# Patient Record
(demographics unavailable — no encounter records)

---

## 2025-03-02 NOTE — ASSESSMENT
[FreeTextEntry1] : #Abnormal TFTs - patient with history of significant allergies - recently tried xolair with worsening in skin rash- now having sensitivity to hives  - patient is also mentions episodes of possible low sugars and light headedness- ?adrenal insufficiency? history of several courses of steroids  - told in the past that her TPO antibodies were abnormal for possible hashimoto's  Plan: - Repeat TFTs including TPO antibodies  - will check for ACTH and 8am cortisol to rule out AI  - will call patient with results

## 2025-03-02 NOTE — HISTORY OF PRESENT ILLNESS
[FreeTextEntry1] : #Abnormal TFTs - patient has been struggling with food allergies, eczema and asthma majority of her life  - She recently tried Xolair and developed worsening in skin reaction  - now having significant hives  - she has also been on multiple courses of steroids over the last year  - does mention one episode where she felt lightheaded and dizzy as if her sugars were low  - during the workup by her allergist, she was found to have possible high TPO antibodies - Here for evaluation to see if any abnormality of her hormone levels  - periods are regular

## 2025-03-24 NOTE — REVIEW OF SYSTEMS
[Atopic Dermatitis] : atopic dermatitis [Nl] : Genitourinary [de-identified] : triamcinolone and Opzelura only for eczema

## 2025-03-24 NOTE — SOCIAL HISTORY
[House] : [unfilled] lives in a house  [None] : none [Single] : single [FreeTextEntry1] : [facility]  Lives with parents  [Smokers in Household] : there are no smokers in the home

## 2025-03-24 NOTE — PHYSICAL EXAM
[Alert] : alert [Well Nourished] : well nourished [No Acute Distress] : no acute distress [Well Developed] : well developed [No Neck Mass] : no neck mass was observed [No LAD] : no lymphadenopathy [Normal Rate and Effort] : normal respiratory rhythm and effort [No Crackles] : no crackles [No Retractions] : no retractions [Wheezing] : no wheezing was heard [Normal Cervical Lymph Nodes] : cervical [Patches] : ~M patches present [Normal Mood] : mood was normal [Judgment and Insight Age Appropriate] : judgement and insight is age appropriate [de-identified] : hyperpigmented patches - increased under her eyes - around lips - inner arms

## 2025-03-24 NOTE — REVIEW OF SYSTEMS
[Atopic Dermatitis] : atopic dermatitis [Nl] : Genitourinary [de-identified] : triamcinolone and Opzelura only for eczema

## 2025-03-24 NOTE — HISTORY OF PRESENT ILLNESS
[de-identified] : Patient is being seen for multiple allergies:   Drug allergies:  Penicillin: Age 4 - rash and joint swelling  Sulfa: Age 18 - vomiting   Food allergies: She has been seeing multiple allergists over the years  Milk:  diarrhea from Similac - changed to Alimentum - age 2 - switched to milk with vomiting - changed to rice milk - with cross contamination she would note hives - vomiting and throat tightness - light headedness - she has used EpiPen 2x 2023 and 2018.   Patient was good with baked milk products but mother advised to stop the introduction.   March 2023 - OIT with Dr. Nicole for milk - some oral itching with dosing.    At home she had an allergic reaction to OIT - needed ER visit.   Treated with Xolair and OIT - 11/23 - every two weeks - 300 mg every two weeks - she was able to tolerate 6 ounces of milk with no reaction until 6/24 - she noted lip swelling - ocular swelling and diffuse hives on her body - bruising on neck and arms - she stopped Xolair 8/24 and stopped milk ingestion 6/24.    She has continued with recurrent hives even off of Xolair - she presently takes Allegra 1-2 tablets in the AM - Xyzal 5mg in PM - benadryl prn - hydroxyzine 25 mg QHS in order sleep.   She has been treated with prednisone x 5 last year.     Dermatologist added Doxycycline to her treatment.  Skin biopsy - urticaria.    Her last dosage of Xolair was 600 mg x 2 - with exacerbation of her hives she noted severe hives at site of the injection.   Dr. Nicole advised her that this was a bacterial infection secondary to strept infection.    Oat - age 23 - facial flushing and abdominal pain  Wheat - age 23 - joint swelling - throat discomfort and hives  Gluten Sesame - age 10 - throat tightness - wheezing - vomiting - rash  Beef - age 8 - throat tightness - wheezing - rash  Stapleton - age 23 - diagnosed with alpha gal - 3 - 4 hours after ingestion   Latex: age 13 - contact allergy.   Patient with asthma since age 3 - worse when exposed to milk products - steamed milk -

## 2025-03-24 NOTE — SOCIAL HISTORY
[House] : [unfilled] lives in a house  [None] : none [Single] : single [FreeTextEntry1] : [facility] inform[facility]  Lives with parents  [Smokers in Household] : there are no smokers in the home

## 2025-03-24 NOTE — ASSESSMENT
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   Patient instructed on the proper use of an EpiPen - precautions - follow up after use of EpiPen - need to have device available at all times Patient instructed to have Benadryl, in addition to EpiPen, available at all times - reviewed indications for use of Benadryl - dosing - precautions and follow up.

## 2025-03-24 NOTE — PHYSICAL EXAM
[Alert] : alert [Well Nourished] : well nourished [No Acute Distress] : no acute distress [Well Developed] : well developed [No Neck Mass] : no neck mass was observed [No LAD] : no lymphadenopathy [Normal Rate and Effort] : normal respiratory rhythm and effort [No Crackles] : no crackles [No Retractions] : no retractions [Wheezing] : no wheezing was heard [Normal Cervical Lymph Nodes] : cervical [Patches] : ~M patches present [Normal Mood] : mood was normal [Judgment and Insight Age Appropriate] : judgement and insight is age appropriate [de-identified] : hyperpigmented patches - increased under her eyes - around lips - inner arms

## 2025-03-24 NOTE — HISTORY OF PRESENT ILLNESS
[de-identified] : Patient is being seen for multiple allergies:   Drug allergies:  Penicillin: Age 4 - rash and joint swelling  Sulfa: Age 18 - vomiting   Food allergies: She has been seeing multiple allergists over the years  Milk:  diarrhea from Similac - changed to Alimentum - age 2 - switched to milk with vomiting - changed to rice milk - with cross contamination she would note hives - vomiting and throat tightness - light headedness - she has used EpiPen 2x 2023 and 2018.   Patient was good with baked milk products but mother advised to stop the introduction.   March 2023 - OIT with Dr. Nicole for milk - some oral itching with dosing.    At home she had an allergic reaction to OIT - needed ER visit.   Treated with Xolair and OIT - 11/23 - every two weeks - 300 mg every two weeks - she was able to tolerate 6 ounces of milk with no reaction until 6/24 - she noted lip swelling - ocular swelling and diffuse hives on her body - bruising on neck and arms - she stopped Xolair 8/24 and stopped milk ingestion 6/24.    She has continued with recurrent hives even off of Xolair - she presently takes Allegra 1-2 tablets in the AM - Xyzal 5mg in PM - benadryl prn - hydroxyzine 25 mg QHS in order sleep.   She has been treated with prednisone x 5 last year.     Dermatologist added Doxycycline to her treatment.  Skin biopsy - urticaria.    Her last dosage of Xolair was 600 mg x 2 - with exacerbation of her hives she noted severe hives at site of the injection.   Dr. Nicole advised her that this was a bacterial infection secondary to strept infection.    Oat - age 23 - facial flushing and abdominal pain  Wheat - age 23 - joint swelling - throat discomfort and hives  Gluten Sesame - age 10 - throat tightness - wheezing - vomiting - rash  Beef - age 8 - throat tightness - wheezing - rash  Stapleton - age 23 - diagnosed with alpha gal - 3 - 4 hours after ingestion   Latex: age 13 - contact allergy.   Patient with asthma since age 3 - worse when exposed to milk products - steamed milk -

## 2025-04-15 NOTE — PHYSICAL EXAM
[Alert] : alert [Well Nourished] : well nourished [No Acute Distress] : no acute distress [Well Developed] : well developed [No Neck Mass] : no neck mass was observed [No LAD] : no lymphadenopathy [Normal Rate and Effort] : normal respiratory rhythm and effort [No Crackles] : no crackles [No Retractions] : no retractions [Wheezing] : no wheezing was heard [Normal Cervical Lymph Nodes] : cervical [Patches] : ~M patches present [Normal Mood] : mood was normal [Judgment and Insight Age Appropriate] : judgement and insight is age appropriate [de-identified] : hyperpigmented patches - increased under her eyes - around lips - inner arms

## 2025-04-15 NOTE — ASSESSMENT
[FreeTextEntry1] : Eczematous dermatitis:  Patient will return for more extensive patch testing  Dupixent reviewed with patient after completion of patch testing    Mild intermittent asthma:  Albuterol 2 puffs QID prn   Multiple food allergies:  Very high total IgE level with many mildly positive food ImmunoCAP based upon the high IgE  Milk allergy - S/P OIT with recurrent reactions - will hold off on OIT and Xolair at this time EpiPen renewed.   Patient instructed on the proper use of an EpiPen - precautions - follow up after use of EpiPen - need to have device available at all times Patient instructed to have Benadryl, in addition to EpiPen, available at all times - reviewed indications for use of Benadryl - dosing - precautions and follow up.

## 2025-04-15 NOTE — HISTORY OF PRESENT ILLNESS
[de-identified] : Patient here to review plan of action regarding her skin - food allergies - see data reviewed with patient and mother

## 2025-04-15 NOTE — DATA REVIEWED
[FreeTextEntry1] : Photos c/w eczematous eruption - no evidence of urticaria  Patch testing reviewed was TRUE testing and reported all to be negative Lab testing reviewed with patient and mother - many false positive ImmunoCAP based upon very high IgE level - this would include alpha Gal IgE antibody

## 2025-04-23 NOTE — ASSESSMENT
[FreeTextEntry1] : #Hyperprolactinemia  - patient with history of significant allergies- currently getting patch testing done  - Found to have elevated prolactin without any clinical signs or symptoms of hyperprolactinemia  - no evidence of amenorrhea, galactorrhea, hirsutism, acne, visual issues Plan: - Repeat prolactin levels - will call patient with results

## 2025-04-23 NOTE — HISTORY OF PRESENT ILLNESS
[FreeTextEntry1] :  - patient has been struggling with food allergies, eczema and asthma majority of her life  - She recently tried Xolair and developed worsening in skin reaction  - now having significant hives  - she has also been on multiple courses of steroids over the last year  - does mention one episode where she felt lightheaded and dizzy as if her sugars were low  - during the workup by her allergist, she was found to have possible high TPO antibodies - Here for evaluation to see if any abnormality of her hormone levels  - periods are regular   #Hyperprolactinemia - incidentally found to have PRL in the 40s. Patient was getting blood work done for the pituitary when she was originally found to have elevated ACTH and cortisol.  - on repeat labs the ACTH and cortisol improved however the prolactin and monomeric prolactin was noted to be in the 40s  - patient is not having galactorrhea - no visual issues - no irregular periods- periods are regular  - no hirsuitism or acne

## 2025-04-30 NOTE — ASSESSMENT
[FreeTextEntry1] : Eczematous dermatitis:  RV 72 hour patch interpretation   This visit was for 15 minutes with 80% of the time devoted to face-to-face counseling and coordination of care and 20% of the time devoted to review of medical records/lab results/ordering labs and other tests/speaking to patient and family members/writing the note.

## 2025-05-01 NOTE — ASSESSMENT
[FreeTextEntry1] : Eczematous dermatitis - moderate to severe   No evidence of contact dermatitis Patient to start Dupixent  She will use Refresh eye drops in AM and follow with Pataday 0.70% QAM  This visit was for 30 minutes with 80% of the time devoted to face-to-face counseling and coordination of care and 20% of the time devoted to review of medical records/lab results/ordering labs and other tests/speaking to patient and family members/writing the note.

## 2025-05-28 NOTE — HISTORY OF PRESENT ILLNESS
[de-identified] : Patient is using Napcon in the AM - patient has appointment with her eye doctor this afternoon.   Concern about her eyes and Dupixent

## 2025-05-28 NOTE — PHYSICAL EXAM
[Alert] : alert [Well Nourished] : well nourished [No Acute Distress] : no acute distress [Well Developed] : well developed [No Neck Mass] : no neck mass was observed [No LAD] : no lymphadenopathy [Patches] : ~M patches present [de-identified] : erythema of conjunctiva with mild barby orbital swelling  [de-identified] : eczematous patches

## 2025-05-28 NOTE — ASSESSMENT
[FreeTextEntry1] : Eczematous dermatitis - moderate to severe   No evidence of contact dermatitis Patient to start Dupixent  She will use Refresh eye drops in AM and follow with Pataday 0.70% QAM Eye care sheet given to patient  She will discuss eye care sheet with her ophthalmologist